# Patient Record
Sex: FEMALE | Employment: PART TIME | URBAN - METROPOLITAN AREA
[De-identification: names, ages, dates, MRNs, and addresses within clinical notes are randomized per-mention and may not be internally consistent; named-entity substitution may affect disease eponyms.]

---

## 2017-10-16 PROCEDURE — 88305 TISSUE EXAM BY PATHOLOGIST: CPT | Performed by: RADIOLOGY

## 2017-10-16 PROCEDURE — 88377 M/PHMTRC ALYS ISHQUANT/SEMIQ: CPT | Performed by: RADIOLOGY

## 2017-10-16 PROCEDURE — 88360 TUMOR IMMUNOHISTOCHEM/MANUAL: CPT | Performed by: RADIOLOGY

## 2017-12-21 PROCEDURE — 88305 TISSUE EXAM BY PATHOLOGIST: CPT | Performed by: RADIOLOGY

## 2018-02-19 PROBLEM — C50.011: Status: ACTIVE | Noted: 2018-02-19

## 2018-02-23 PROCEDURE — 88305 TISSUE EXAM BY PATHOLOGIST: CPT | Performed by: SURGERY

## 2018-03-22 ENCOUNTER — OFFICE VISIT (OUTPATIENT)
Dept: HEMATOLOGY/ONCOLOGY | Age: 58
End: 2018-03-22
Attending: INTERNAL MEDICINE
Payer: COMMERCIAL

## 2018-03-22 VITALS
WEIGHT: 138 LBS | OXYGEN SATURATION: 97 % | HEIGHT: 60.5 IN | RESPIRATION RATE: 18 BRPM | DIASTOLIC BLOOD PRESSURE: 73 MMHG | HEART RATE: 89 BPM | SYSTOLIC BLOOD PRESSURE: 112 MMHG | TEMPERATURE: 99 F | BODY MASS INDEX: 26.4 KG/M2

## 2018-03-22 DIAGNOSIS — C50.011 MALIGNANT NEOPLASM OF AREOLA OF RIGHT BREAST IN FEMALE, UNSPECIFIED ESTROGEN RECEPTOR STATUS (HCC): Primary | ICD-10-CM

## 2018-03-22 PROCEDURE — 99205 OFFICE O/P NEW HI 60 MIN: CPT | Performed by: INTERNAL MEDICINE

## 2018-03-22 RX ORDER — ANASTROZOLE 1 MG/1
1 TABLET ORAL DAILY
COMMUNITY
Start: 2018-03-12

## 2018-03-22 RX ORDER — MULTIVIT-MIN/IRON/FOLIC ACID/K 18-600-40
2000 CAPSULE ORAL DAILY
COMMUNITY

## 2018-03-22 NOTE — PROGRESS NOTES
Pt here for second opinion for breast cancer. Pt had abnormal mammo 8/17, diagnostic alcira 10/17 with biopsy following. Pt had right mastectomy with expander on 2/6/18, has since had scar revision. Pt started on Anastrazole Saturday.  Pt feeling good physical

## 2018-03-26 NOTE — CONSULTS
[facility]    PATIENT'S NAME: Hemanth Meek   CONSULTING PHYSICIAN: Siddhartha Castro M.D.    PATIENT ACCOUNT #: [de-identified] LOCATION: [address] [place] [address] RECORD #: [id number] YOB: 1960   CONSULTATION DATE: 03/22/2018       JACKSON may have an abnormality in the opposite breast.  She ultimately underwent an MRI-guided biopsy December 21, in the left breast.  It demonstrated fibrocystic changes and nodular sclerosing adenosis, but no evidence of malignancy.   Ultimately, the patient pr ALLERGIES:  She has intolerance or allergy to codeine which causes nausea, and she ended up with 2 scopolamine patches on perioperatively and she had problems with a little bit of delirium with anticholinergic syndrome.       SOCIAL HISTORY:  She is mar GENERAL:  A well-developed, well-nourished female, in no acute distress. VITAL SIGNS:  Her performance status is 0. Her weight is 138 pounds, blood pressure is 112/73, pulse 89, respiratory rate is 20, temperature is 98.5. HEENT:  Unremarkable.   She but the impact is quite low for patients who are at low risk. If she is unable to tolerate the aromatase inhibitor and ends up on tamoxifen, we talked about the fact that 10 years is probably better than 5.   We also talked about the risks of tamoxifen inc

## 2018-11-27 PROCEDURE — 36415 COLL VENOUS BLD VENIPUNCTURE: CPT | Performed by: FAMILY MEDICINE

## 2018-11-27 PROCEDURE — 86765 RUBEOLA ANTIBODY: CPT | Performed by: FAMILY MEDICINE

## 2018-11-27 PROCEDURE — 86735 MUMPS ANTIBODY: CPT | Performed by: FAMILY MEDICINE

## 2018-11-27 PROCEDURE — 86480 TB TEST CELL IMMUN MEASURE: CPT | Performed by: FAMILY MEDICINE

## 2018-11-27 PROCEDURE — 86762 RUBELLA ANTIBODY: CPT | Performed by: FAMILY MEDICINE

## 2019-10-16 ENCOUNTER — APPOINTMENT (OUTPATIENT)
Dept: OTHER | Facility: HOSPITAL | Age: 59
End: 2019-10-16
Attending: PHYSICIAN ASSISTANT

## 2021-04-10 DIAGNOSIS — Z23 NEED FOR VACCINATION: ICD-10-CM
